# Patient Record
Sex: FEMALE | Race: WHITE
[De-identification: names, ages, dates, MRNs, and addresses within clinical notes are randomized per-mention and may not be internally consistent; named-entity substitution may affect disease eponyms.]

---

## 2020-06-16 ENCOUNTER — APPOINTMENT (OUTPATIENT)
Dept: FAMILY MEDICINE | Facility: CLINIC | Age: 35
End: 2020-06-16
Payer: COMMERCIAL

## 2020-06-16 ENCOUNTER — NON-APPOINTMENT (OUTPATIENT)
Age: 35
End: 2020-06-16

## 2020-06-16 VITALS
HEIGHT: 68 IN | HEART RATE: 115 BPM | WEIGHT: 205 LBS | TEMPERATURE: 99.1 F | RESPIRATION RATE: 14 BRPM | OXYGEN SATURATION: 98 % | DIASTOLIC BLOOD PRESSURE: 80 MMHG | SYSTOLIC BLOOD PRESSURE: 118 MMHG | BODY MASS INDEX: 31.07 KG/M2

## 2020-06-16 DIAGNOSIS — Z76.89 PERSONS ENCOUNTERING HEALTH SERVICES IN OTHER SPECIFIED CIRCUMSTANCES: ICD-10-CM

## 2020-06-16 PROCEDURE — 93000 ELECTROCARDIOGRAM COMPLETE: CPT

## 2020-06-16 PROCEDURE — 36415 COLL VENOUS BLD VENIPUNCTURE: CPT

## 2020-06-16 PROCEDURE — 99204 OFFICE O/P NEW MOD 45 MIN: CPT | Mod: 25

## 2020-06-16 NOTE — REVIEW OF SYSTEMS
[Joint Pain] : joint pain [Recent Change In Weight] : ~T recent weight change [Joint Swelling] : joint swelling [Muscle Pain] : no muscle pain [Muscle Weakness] : muscle weakness [Joint Stiffness] : joint stiffness [Skin Rash] : skin rash [Back Pain] : back pain [FreeTextEntry1] : breast large [Negative] : Gastrointestinal

## 2020-06-16 NOTE — HEALTH RISK ASSESSMENT
[No] : In the past 12 months have you used drugs other than those required for medical reasons? No [Patient declined mammogram] : Patient declined mammogram [Patient declined bone density test] : Patient declined bone density test [HIV Test offered] : HIV Test offered [Patient declined colonoscopy] : Patient declined colonoscopy [Hepatitis C test offered] : Hepatitis C test offered [None] : None [With Family] : lives with family [# of Members in Household ___] :  household currently consist of [unfilled] member(s) [Employed] : employed [College] : College [] :  [# Of Children ___] : has [unfilled] children [Sexually Active] : sexually active [Feels Safe at Home] : Feels safe at home [Fully functional (bathing, dressing, toileting, transferring, walking, feeding)] : Fully functional (bathing, dressing, toileting, transferring, walking, feeding) [Fully functional (using the telephone, shopping, preparing meals, housekeeping, doing laundry, using] : Fully functional and needs no help or supervision to perform IADLs (using the telephone, shopping, preparing meals, housekeeping, doing laundry, using transportation, managing medications and managing finances) [Reports normal functional visual acuity (ie: able to read med bottle)] : Reports normal functional visual acuity [Smoke Detector] : smoke detector [Carbon Monoxide Detector] : carbon monoxide detector [Safety elements used in home] : safety elements used in home [Sunscreen] : uses sunscreen [Seat Belt] :  uses seat belt [No falls in past year] : Patient reported no falls in the past year [Intercurrent Urgi Care visits] : went to urgent care [Change in mental status noted] : No change in mental status noted [Behavior] : denies difficulty with behavior [Language] : denies difficulty with language [Handling Complex Tasks] : denies difficulty handling complex tasks [Learning/Retaining New Information] : denies difficulty learning/retaining new information [Reasoning] : denies difficulty with reasoning [Reports changes in hearing] : Reports no changes in hearing [Reports changes in vision] : Reports no changes in vision [Spatial Ability and Orientation] : denies difficulty with spatial ability and orientation [Reports changes in dental health] : Reports no changes in dental health [Travel to Developing Areas] : does not  travel to developing areas [Guns at Home] : no guns at home [TB Exposure] : is not being exposed to tuberculosis [Caregiver Concerns] : does not have caregiver concerns [PapSmearDate] : 2018 [de-identified] : no [] : No [Audit-CScore] : 0 [de-identified] : no

## 2020-06-16 NOTE — ASSESSMENT
[FreeTextEntry1] : Basic cardiovascular prevention measures are advised including regular exercise, surveillance medical examination, and prudent portion-controlled low fat diet, rich in a variety of vegetables with minimal added sugars, refined starches, and no artificially hydrogenated oils.\par Labs drawn/ specimens obtained  in office on this date of service  for evaluation of   assessed conditions -   cbc cmp   tsh     to be run at [facility].\par The BMI for CONRAD was assessed. The BMI was found to be high, intervention performed was: lifestyle education regarding diet. Being overweight can negatively impact your health  by increasing risk of high blood pressure, heart disease, stroke, arthritis, or joint pain and diabetes. It is important to exercise 150 minutes per week as well as maintain a calorie appropriate diet that is rich in fruits and vegetables, and low in excess salt and fat.\par \par check echo ro valvular issue she is interested in weight loss medication- phentermine\par .check xray right ankle then mri right ankle no contrast ro tear\par EKG performed on this day in the office and reviewed by AYAH Qiu. It is stable.\par We spent sufficient time to discuss aspects of care; questions were answered  to patient's satisfaction.The diagnosis and care plan were discussed with patient in detail.  Patient test results were  reviewed and explained in full. All questions and concerns  were answered to the best of my knowledge.\par
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
## 2020-06-16 NOTE — PHYSICAL EXAM
[de-identified] : kyphosis shoulder grooves. [Normal] : affect was normal and insight and judgment were intact [de-identified] : large  + shoulder grooves.   [de-identified] : right lateral malleolus tender swollen .  [de-identified] : erythematous under right breast

## 2020-06-16 NOTE — COUNSELING
[Potential consequences of obesity discussed] : Potential consequences of obesity discussed [Benefits of weight loss discussed] : Benefits of weight loss discussed [Encouraged to maintain food diary] : Encouraged to maintain food diary [Structured Weight Management Program suggested:] : Structured weight management program suggested [Encouraged to use exercise tracking device] : Encouraged to use exercise tracking device [Encouraged to increase physical activity] : Encouraged to increase physical activity [Target Wt Loss Goal ___] : Weight Loss Goals: Target weight loss goal [unfilled] lbs [FreeTextEntry1] : minal panda nutritionist [Good understanding] : Patient has a good understanding of disease, goals and obesity follow-up plan

## 2020-06-17 ENCOUNTER — TRANSCRIPTION ENCOUNTER (OUTPATIENT)
Age: 35
End: 2020-06-17

## 2020-06-17 LAB
24R-OH-CALCIDIOL SERPL-MCNC: 59.8 PG/ML
ALBUMIN SERPL ELPH-MCNC: 4.9 G/DL
ALP BLD-CCNC: 69 U/L
ALT SERPL-CCNC: 26 U/L
ANION GAP SERPL CALC-SCNC: 13 MMOL/L
AST SERPL-CCNC: 26 U/L
BASOPHILS # BLD AUTO: 0.06 K/UL
BASOPHILS NFR BLD AUTO: 1.1 %
BILIRUB SERPL-MCNC: 0.3 MG/DL
BUN SERPL-MCNC: 15 MG/DL
CALCIUM SERPL-MCNC: 9.8 MG/DL
CHLORIDE SERPL-SCNC: 103 MMOL/L
CO2 SERPL-SCNC: 24 MMOL/L
CREAT SERPL-MCNC: 1.09 MG/DL
EOSINOPHIL # BLD AUTO: 0.09 K/UL
EOSINOPHIL NFR BLD AUTO: 1.7 %
ESTIMATED AVERAGE GLUCOSE: 97 MG/DL
FERRITIN SERPL-MCNC: 65 NG/ML
FOLATE SERPL-MCNC: 13.9 NG/ML
GLUCOSE SERPL-MCNC: 103 MG/DL
HBA1C MFR BLD HPLC: 5 %
HCT VFR BLD CALC: 41.3 %
HCV AB SER QL: NONREACTIVE
HCV S/CO RATIO: 0.13 S/CO
HGB BLD-MCNC: 13.2 G/DL
IMM GRANULOCYTES NFR BLD AUTO: 0.4 %
IRON SATN MFR SERPL: 32 %
IRON SERPL-MCNC: 112 UG/DL
LYMPHOCYTES # BLD AUTO: 2.08 K/UL
LYMPHOCYTES NFR BLD AUTO: 39.4 %
MAGNESIUM SERPL-MCNC: 2.1 MG/DL
MAN DIFF?: NORMAL
MCHC RBC-ENTMCNC: 31.3 PG
MCHC RBC-ENTMCNC: 32 GM/DL
MCV RBC AUTO: 97.9 FL
MONOCYTES # BLD AUTO: 0.46 K/UL
MONOCYTES NFR BLD AUTO: 8.7 %
NEUTROPHILS # BLD AUTO: 2.57 K/UL
NEUTROPHILS NFR BLD AUTO: 48.7 %
PLATELET # BLD AUTO: 292 K/UL
POTASSIUM SERPL-SCNC: 4.2 MMOL/L
PROT SERPL-MCNC: 8 G/DL
RBC # BLD: 4.22 M/UL
RBC # FLD: 12.8 %
SARS-COV-2 IGG SERPL IA-ACNC: <3.8 AU/ML
SARS-COV-2 IGG SERPL QL IA: NEGATIVE
SODIUM SERPL-SCNC: 140 MMOL/L
SUREPATH (TM) PAP: NORMAL
T3 SERPL-MCNC: 89 NG/DL
T3FREE SERPL-MCNC: 2.71 PG/ML
T4 FREE SERPL-MCNC: 1.2 NG/DL
TIBC SERPL-MCNC: 345 UG/DL
TSH SERPL-ACNC: 1.38 UIU/ML
UIBC SERPL-MCNC: 233 UG/DL
VIT B12 SERPL-MCNC: 501 PG/ML
WBC # FLD AUTO: 5.28 K/UL

## 2020-06-19 ENCOUNTER — TRANSCRIPTION ENCOUNTER (OUTPATIENT)
Age: 35
End: 2020-06-19

## 2020-06-20 ENCOUNTER — NON-APPOINTMENT (OUTPATIENT)
Age: 35
End: 2020-06-20

## 2020-06-24 ENCOUNTER — TRANSCRIPTION ENCOUNTER (OUTPATIENT)
Age: 35
End: 2020-06-24

## 2020-06-26 ENCOUNTER — RX CHANGE (OUTPATIENT)
Age: 35
End: 2020-06-26

## 2020-08-04 ENCOUNTER — RESULT CHARGE (OUTPATIENT)
Age: 35
End: 2020-08-04

## 2020-08-04 ENCOUNTER — APPOINTMENT (OUTPATIENT)
Dept: FAMILY MEDICINE | Facility: CLINIC | Age: 35
End: 2020-08-04
Payer: COMMERCIAL

## 2020-08-04 VITALS
HEART RATE: 78 BPM | BODY MASS INDEX: 30.01 KG/M2 | RESPIRATION RATE: 14 BRPM | TEMPERATURE: 97.5 F | WEIGHT: 198 LBS | DIASTOLIC BLOOD PRESSURE: 70 MMHG | OXYGEN SATURATION: 99 % | HEIGHT: 68 IN | SYSTOLIC BLOOD PRESSURE: 100 MMHG

## 2020-08-04 DIAGNOSIS — Z02.71 ENCOUNTER FOR DISABILITY DETERMINATION: ICD-10-CM

## 2020-08-04 LAB
BILIRUB UR QL STRIP: NEGATIVE
GLUCOSE UR-MCNC: NEGATIVE
HCG UR QL: 0.2 EU/DL
HGB UR QL STRIP.AUTO: NEGATIVE
KETONES UR-MCNC: NEGATIVE
LEUKOCYTE ESTERASE UR QL STRIP: NEGATIVE
NITRITE UR QL STRIP: NEGATIVE
PH UR STRIP: 7
PROT UR STRIP-MCNC: NEGATIVE
SP GR UR STRIP: 1.01

## 2020-08-04 PROCEDURE — 99214 OFFICE O/P EST MOD 30 MIN: CPT

## 2020-08-06 PROBLEM — Z02.71 ENCOUNTER FOR DISABILITY EXAMINATION: Status: ACTIVE | Noted: 2020-08-06

## 2020-08-06 NOTE — PHYSICAL EXAM
[Normal] : normal rate, regular rhythm, normal S1 and S2 and no murmur heard [de-identified] : ankle pain

## 2020-08-06 NOTE — HISTORY OF PRESENT ILLNESS
[FreeTextEntry1] : patient presents for fmla\par  [de-identified] : 36 yo wf here for follow up \par she needs to have surgery on her ankle and she is not sure the procedure /protocol\par \par I see Antwon Arvizu I am on 1180 for 2 weeks to jump start my metabolism. I did not meet w him but he emailed me a menu\par I followed it I have lost weight\par i have been diligent\par My  lost 15 pounds\par I notice  the phentermine the first two weeks but not the last\par

## 2020-08-06 NOTE — ASSESSMENT
[FreeTextEntry1] : fmla paperwork for her  who will be taking care of her  .Milad lerma. \par  renew phentermine for weight loss she is doing very very well and she is trying very hard\par The BMI for CONRAD was assessed. The BMI was found to be high, intervention performed was: lifestyle education regarding diet. Being overweight can negatively impact your health  by increasing risk of high blood pressure, heart disease, stroke, arthritis, or joint pain and diabetes. It is important to exercise 150 minutes per week as well as maintain a calorie appropriate diet that is rich in fruits and vegetables, and low in excess salt and fat.\par

## 2020-09-02 ENCOUNTER — TRANSCRIPTION ENCOUNTER (OUTPATIENT)
Age: 35
End: 2020-09-02

## 2020-10-05 ENCOUNTER — TRANSCRIPTION ENCOUNTER (OUTPATIENT)
Age: 35
End: 2020-10-05

## 2020-10-05 RX ORDER — PHENTERMINE HYDROCHLORIDE 15 MG/1
15 CAPSULE ORAL
Qty: 30 | Refills: 0 | Status: DISCONTINUED | COMMUNITY
Start: 2020-06-24 | End: 2020-10-05

## 2020-10-06 ENCOUNTER — TRANSCRIPTION ENCOUNTER (OUTPATIENT)
Age: 35
End: 2020-10-06

## 2020-10-06 VITALS — BODY MASS INDEX: 28.13 KG/M2 | WEIGHT: 185 LBS

## 2020-10-16 ENCOUNTER — APPOINTMENT (OUTPATIENT)
Dept: FAMILY MEDICINE | Facility: CLINIC | Age: 35
End: 2020-10-16
Payer: COMMERCIAL

## 2020-10-16 VITALS
SYSTOLIC BLOOD PRESSURE: 110 MMHG | HEIGHT: 68 IN | OXYGEN SATURATION: 99 % | HEART RATE: 68 BPM | BODY MASS INDEX: 28.04 KG/M2 | RESPIRATION RATE: 14 BRPM | DIASTOLIC BLOOD PRESSURE: 80 MMHG | WEIGHT: 185 LBS | TEMPERATURE: 97.8 F

## 2020-10-16 DIAGNOSIS — Z23 ENCOUNTER FOR IMMUNIZATION: ICD-10-CM

## 2020-10-16 PROCEDURE — 99213 OFFICE O/P EST LOW 20 MIN: CPT | Mod: 25

## 2020-10-16 PROCEDURE — G0008: CPT

## 2020-10-16 PROCEDURE — 90686 IIV4 VACC NO PRSV 0.5 ML IM: CPT

## 2020-10-16 NOTE — PHYSICAL EXAM
[Normal] : no respiratory distress, lungs were clear to auscultation bilaterally and no accessory muscle use [de-identified] : ankle pain
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
## 2020-10-16 NOTE — REVIEW OF SYSTEMS
[Recent Change In Weight] : ~T recent weight change [Fatigue] : fatigue [Joint Pain] : joint pain [FreeTextEntry2] : loss 30 pounds

## 2020-10-16 NOTE — ASSESSMENT
[FreeTextEntry1] :  \par  renew phentermine for weight loss she is doing very well contact ALEA panda\par The BMI for CONRAD was assessed. The BMI was found to be high, intervention performed was: lifestyle education regarding diet. Being overweight can negatively impact your health  by increasing risk of high blood pressure, heart disease, stroke, arthritis, or joint pain and diabetes. It is important to exercise 150 minutes per week as well as maintain a calorie appropriate diet that is rich in fruits and vegetables, and low in excess salt and fat.\par  Information regarding flu shot reviewed. All questions answered.Flu shot .5 cc IM   left   deltoid . No reaction noted. Advised patients to wash hands to reduce the spread of infection.\par  wound healing measure reviewed for ankle

## 2020-11-16 ENCOUNTER — APPOINTMENT (OUTPATIENT)
Dept: FAMILY MEDICINE | Facility: CLINIC | Age: 35
End: 2020-11-16
Payer: COMMERCIAL

## 2020-11-16 VITALS
HEART RATE: 84 BPM | RESPIRATION RATE: 14 BRPM | SYSTOLIC BLOOD PRESSURE: 110 MMHG | OXYGEN SATURATION: 98 % | DIASTOLIC BLOOD PRESSURE: 78 MMHG | TEMPERATURE: 97.7 F | BODY MASS INDEX: 27.58 KG/M2 | WEIGHT: 182 LBS | HEIGHT: 68 IN

## 2020-11-16 PROCEDURE — 99072 ADDL SUPL MATRL&STAF TM PHE: CPT

## 2020-11-16 PROCEDURE — 99213 OFFICE O/P EST LOW 20 MIN: CPT

## 2020-11-16 NOTE — HISTORY OF PRESENT ILLNESS
[FreeTextEntry1] : pt presents for med follow up  [de-identified] : Patient reports today for follow up and med check. Patient reports feeling well today. She has lost 3 pounds since last visit. Patient feels weight loss has been more stagnant recently since ankle sx. NEFTALY mcfarland just got cleared to go back to the gym, she is still working with nutritionist. She denies any side effects.

## 2020-11-16 NOTE — ASSESSMENT
[FreeTextEntry1] : Phentermine increased to 37.5mg daily\par ISTOP checked. Reference #: [id number]\par Precautions reviewed. \par RTO in 1 mo

## 2020-12-16 ENCOUNTER — APPOINTMENT (OUTPATIENT)
Dept: OBGYN | Facility: CLINIC | Age: 35
End: 2020-12-16
Payer: COMMERCIAL

## 2020-12-16 VITALS
HEIGHT: 68 IN | TEMPERATURE: 97.3 F | DIASTOLIC BLOOD PRESSURE: 70 MMHG | SYSTOLIC BLOOD PRESSURE: 120 MMHG | BODY MASS INDEX: 27.79 KG/M2 | WEIGHT: 183.38 LBS

## 2020-12-16 DIAGNOSIS — Z23 ENCOUNTER FOR IMMUNIZATION: ICD-10-CM

## 2020-12-16 DIAGNOSIS — Z87.440 PERSONAL HISTORY OF URINARY (TRACT) INFECTIONS: ICD-10-CM

## 2020-12-16 DIAGNOSIS — Z01.419 ENCOUNTER FOR GYNECOLOGICAL EXAMINATION (GENERAL) (ROUTINE) W/OUT ABNORMAL FINDINGS: ICD-10-CM

## 2020-12-16 DIAGNOSIS — Z80.0 FAMILY HISTORY OF MALIGNANT NEOPLASM OF DIGESTIVE ORGANS: ICD-10-CM

## 2020-12-16 DIAGNOSIS — Z78.9 OTHER SPECIFIED HEALTH STATUS: ICD-10-CM

## 2020-12-16 LAB
HCG UR QL: NEGATIVE
QUALITY CONTROL: YES

## 2020-12-16 PROCEDURE — 99385 PREV VISIT NEW AGE 18-39: CPT | Mod: 25

## 2020-12-16 PROCEDURE — 90649 4VHPV VACCINE 3 DOSE IM: CPT

## 2020-12-16 PROCEDURE — 99072 ADDL SUPL MATRL&STAF TM PHE: CPT

## 2020-12-16 PROCEDURE — 81025 URINE PREGNANCY TEST: CPT

## 2020-12-16 PROCEDURE — 90471 IMMUNIZATION ADMIN: CPT

## 2020-12-16 NOTE — HISTORY OF PRESENT ILLNESS
[Patient reported PAP Smear was abnormal] : Patient reported PAP Smear was abnormal [HIV test declined] : HIV test declined [Syphilis test declined] : Syphilis test declined [Hepatitis B test declined] : Hepatitis B test declined [Hepatitis C test declined] : Hepatitis C test declined [Natural Family Planning] : uses natural family planning [Monogamous (Male Partner)] : is monogamous with a male partner [Y] : Positive pregnancy history [PapSmeardate] : 2019 [TextBox_31] : jong ob/gyn, unsure of abnormality [LMPDate] : 11/23/20 [MensesFreq] : 28 [MensesLength] : 5-6 [de-identified] : x 7yrs [PGHxTotal] : 3 [[facility] : 2 [PGHxABInduced] : 1 [FreeTextEntry1] : 2017: NOEMY REID, 7lb4oz, FT. 2019: RACQUEL REID, 8lb4oz, FT. ETOP with D&C. Denies cysts, fibroids, STI. Hx of abnormal pap 2019

## 2020-12-16 NOTE — DISCUSSION/SUMMARY
[FreeTextEntry1] : 34 yo here for well woman exam, h/o abnormal pap 2019. \par 1) Health maintenance: \par Pap + HPV\par GC/CT\par Gardasil #1 given left deltoid without incident\par \par 2) Contraception: natural family planning \par \par Return in 2 months for Gardasil #2\par \par

## 2020-12-16 NOTE — PHYSICAL EXAM
[Appropriately responsive] : appropriately responsive [Alert] : alert [No Acute Distress] : no acute distress [No Lymphadenopathy] : no lymphadenopathy [Soft] : soft [Non-tender] : non-tender [Non-distended] : non-distended [No HSM] : No HSM [No Lesions] : no lesions [No Mass] : no mass [Oriented x3] : oriented x3 [Examination Of The Breasts] : a normal appearance [No Discharge] : no discharge [No Masses] : no breast masses were palpable [Labia Majora] : normal [Labia Minora] : normal [Normal] : normal [Uterine Adnexae] : normal [Tenderness] : nontender [Enlarged ___ wks] : not enlarged [FreeTextEntry8] : nodular area palpated on posterior lip of cervix likely nabothian cyst

## 2020-12-18 ENCOUNTER — APPOINTMENT (OUTPATIENT)
Dept: FAMILY MEDICINE | Facility: CLINIC | Age: 35
End: 2020-12-18
Payer: COMMERCIAL

## 2020-12-18 VITALS
DIASTOLIC BLOOD PRESSURE: 74 MMHG | OXYGEN SATURATION: 99 % | RESPIRATION RATE: 14 BRPM | WEIGHT: 184 LBS | HEIGHT: 68 IN | TEMPERATURE: 97.2 F | BODY MASS INDEX: 27.89 KG/M2 | SYSTOLIC BLOOD PRESSURE: 120 MMHG | HEART RATE: 62 BPM

## 2020-12-18 LAB
C TRACH RRNA SPEC QL NAA+PROBE: NOT DETECTED
HPV HIGH+LOW RISK DNA PNL CVX: NOT DETECTED
N GONORRHOEA RRNA SPEC QL NAA+PROBE: NOT DETECTED
SOURCE TP AMPLIFICATION: NORMAL

## 2020-12-18 PROCEDURE — 99072 ADDL SUPL MATRL&STAF TM PHE: CPT

## 2020-12-18 PROCEDURE — 99213 OFFICE O/P EST LOW 20 MIN: CPT

## 2020-12-18 RX ORDER — PHENTERMINE HYDROCHLORIDE 30 MG/1
30 CAPSULE ORAL
Qty: 30 | Refills: 0 | Status: COMPLETED | COMMUNITY
Start: 2020-10-05

## 2020-12-18 RX ORDER — OXYCODONE AND ACETAMINOPHEN 5; 325 MG/1; MG/1
5-325 TABLET ORAL
Qty: 30 | Refills: 0 | Status: COMPLETED | COMMUNITY
Start: 2020-09-02

## 2020-12-18 NOTE — HISTORY OF PRESENT ILLNESS
[FreeTextEntry1] : pt presents for med follow up  [de-identified] : Patient reports today for follow up and med check. Patient reports feeling well today. She is not able to exercise bc of the pandemic. she will get her menses tomorrow. \par \par I have been taking it every other day.\par it is more effective when I exercise. so since I am not exercising then I don’t take it and I want to get the most out of it. \par we abide by the diet \par He sent me the 14 d meal plan and jump start. \par I need a face to face. the zoom doesn’t work for me\par I am happy where I am \par i lost 30 pounds. I fit in all y clothes. \par

## 2020-12-18 NOTE — ASSESSMENT
[FreeTextEntry1] : Phentermine increased to 37.5mg daily this is her last renewal istop checked\par  The BMI for CONRAD was assessed. The BMI was found to be high, intervention performed was: lifestyle education regarding diet. Being overweight can negatively impact your health  by increasing risk of high blood pressure, heart disease, stroke, arthritis, or joint pain and diabetes. It is important to exercise 150 minutes per week as well as maintain a calorie appropriate diet that is rich in fruits and vegetables, and low in excess salt and fat.\par follow up Antwon Brand for diet \par

## 2020-12-23 LAB — CYTOLOGY CVX/VAG DOC THIN PREP: NORMAL

## 2021-01-25 ENCOUNTER — TRANSCRIPTION ENCOUNTER (OUTPATIENT)
Age: 36
End: 2021-01-25

## 2021-01-27 ENCOUNTER — TRANSCRIPTION ENCOUNTER (OUTPATIENT)
Age: 36
End: 2021-01-27

## 2021-01-27 DIAGNOSIS — Z00.00 ENCOUNTER FOR GENERAL ADULT MEDICAL EXAMINATION W/OUT ABNORMAL FINDINGS: ICD-10-CM

## 2021-02-16 ENCOUNTER — APPOINTMENT (OUTPATIENT)
Dept: OBGYN | Facility: CLINIC | Age: 36
End: 2021-02-16

## 2021-02-16 VITALS
BODY MASS INDEX: 27.28 KG/M2 | WEIGHT: 180 LBS | HEIGHT: 68 IN | DIASTOLIC BLOOD PRESSURE: 80 MMHG | RESPIRATION RATE: 16 BRPM | SYSTOLIC BLOOD PRESSURE: 112 MMHG

## 2021-02-16 LAB
HCG UR QL: NEGATIVE
QUALITY CONTROL: YES

## 2022-10-04 ENCOUNTER — NON-APPOINTMENT (OUTPATIENT)
Age: 37
End: 2022-10-04

## 2022-10-04 ENCOUNTER — APPOINTMENT (OUTPATIENT)
Dept: FAMILY MEDICINE | Facility: CLINIC | Age: 37
End: 2022-10-04

## 2022-10-04 VITALS
DIASTOLIC BLOOD PRESSURE: 80 MMHG | HEART RATE: 83 BPM | OXYGEN SATURATION: 98 % | WEIGHT: 211 LBS | SYSTOLIC BLOOD PRESSURE: 122 MMHG | BODY MASS INDEX: 31.98 KG/M2 | HEIGHT: 68 IN | RESPIRATION RATE: 14 BRPM

## 2022-10-04 DIAGNOSIS — R53.83 OTHER FATIGUE: ICD-10-CM

## 2022-10-04 DIAGNOSIS — M25.571 PAIN IN RIGHT ANKLE AND JOINTS OF RIGHT FOOT: ICD-10-CM
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
## 2022-10-04 PROCEDURE — 36415 COLL VENOUS BLD VENIPUNCTURE: CPT

## 2022-10-04 PROCEDURE — 99214 OFFICE O/P EST MOD 30 MIN: CPT | Mod: 25

## 2022-10-04 PROCEDURE — 93000 ELECTROCARDIOGRAM COMPLETE: CPT

## 2022-10-04 NOTE — REVIEW OF SYSTEMS
[Recent Change In Weight] : ~T recent weight change [Negative] : Respiratory [FreeTextEntry2] : post partum [FreeTextEntry9] : enlarged breast

## 2022-10-04 NOTE — HISTORY OF PRESENT ILLNESS
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 she is not depressed. she is in the thick of it. her kids have milk intolerance if she has anything with it the child is miserable. \par  baby is healthy\par she saw a nutritionist. \par she stopped. as she got her daughter into formula.  \par It was not enough calorie dieficit  she did not lose weight. Tisha Purcell the [employer] she gave me a meal plan.  she thought I was in starvation mode and it was hormonal. 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 she even hired a . 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 \par My ankle is great. \par \par I see Dr Dill for gyn. \par My  got a vascectomy

## 2022-10-04 NOTE — ASSESSMENT
[FreeTextEntry1] : The BMI for CONRAD was assessed. The BMI was found to be high, intervention performed was: lifestyle education regarding diet. Being overweight can negatively impact your health  by increasing risk of high blood pressure, heart disease, stroke, arthritis, or joint pain and diabetes. It is important to exercise 150 minutes per week as well as maintain a calorie appropriate diet that is rich in fruits and vegetables, and low in excess salt and fat.\par Labs drawn/ specimens obtained  in office on this date of service  for evaluation of   assessed conditions -      to be run at [facility].\par EKG performed on this day in the office  to evaluate for any ischemia, new arrhythmia, and for any significant changes and reviewed by AYAH Qiu. It is stable.\par \par start phentermine 30 mg  Patient counseled on proper medications administration and treatment duration as well as the importance of medication adherence. I discussed at length the risks and benefits of treatment and possible side effects.  Advised patient to discontinue medication and call seek medical attention if any adverse reactions are noted.  \par Medication compliance , medication side effects and adverse reaction and the importance of compliance was reinforced. Recommend the patient take medications the same time every day to prevent missing a dose. It is also important to keep a list of medications, doses, and purpose of taking the medications, handy in your wallet or purse, so that way all medical personnel know exactly what you are taking.\par \par \par cont personal training\par cont nutritionist\par rto 1 m\par

## 2022-10-05 ENCOUNTER — TRANSCRIPTION ENCOUNTER (OUTPATIENT)
Age: 37
End: 2022-10-05

## 2022-10-07 ENCOUNTER — TRANSCRIPTION ENCOUNTER (OUTPATIENT)
Age: 37
End: 2022-10-07

## 2022-10-07 LAB
A PHAGOCYTOPH IGG TITR SER IF: NORMAL TITER
ALBUMIN SERPL ELPH-MCNC: 4.9 G/DL
ALP BLD-CCNC: 112 U/L
ALT SERPL-CCNC: 25 U/L
ANION GAP SERPL CALC-SCNC: 17 MMOL/L
AST SERPL-CCNC: 26 U/L
B BURGDOR AB SER QL IA: NEGATIVE
B MICROTI IGG TITR SER: NORMAL TITER
BASOPHILS # BLD AUTO: 0.07 K/UL
BASOPHILS NFR BLD AUTO: 1.3 %
BILIRUB SERPL-MCNC: 0.3 MG/DL
BUN SERPL-MCNC: 17 MG/DL
CALCIUM SERPL-MCNC: 10 MG/DL
CHLORIDE SERPL-SCNC: 100 MMOL/L
CO2 SERPL-SCNC: 19 MMOL/L
CREAT SERPL-MCNC: 0.81 MG/DL
E CHAFFEENSIS IGG TITR SER IF: NORMAL TITER
EGFR: 96 ML/MIN/1.73M2
EOSINOPHIL # BLD AUTO: 0.08 K/UL
EOSINOPHIL NFR BLD AUTO: 1.5 %
GLUCOSE SERPL-MCNC: 91 MG/DL
HCT VFR BLD CALC: 40 %
HGB BLD-MCNC: 13.4 G/DL
IMM GRANULOCYTES NFR BLD AUTO: 0.4 %
IRON SATN MFR SERPL: 30 %
IRON SERPL-MCNC: 120 UG/DL
LYMPHOCYTES # BLD AUTO: 2.02 K/UL
LYMPHOCYTES NFR BLD AUTO: 36.7 %
MAGNESIUM SERPL-MCNC: 1.9 MG/DL
MAN DIFF?: NORMAL
MCHC RBC-ENTMCNC: 31.5 PG
MCHC RBC-ENTMCNC: 33.5 GM/DL
MCV RBC AUTO: 93.9 FL
MONOCYTES # BLD AUTO: 0.58 K/UL
MONOCYTES NFR BLD AUTO: 10.5 %
NEUTROPHILS # BLD AUTO: 2.74 K/UL
NEUTROPHILS NFR BLD AUTO: 49.6 %
PLATELET # BLD AUTO: 325 K/UL
POTASSIUM SERPL-SCNC: 4.8 MMOL/L
PROT SERPL-MCNC: 8 G/DL
RBC # BLD: 4.26 M/UL
RBC # FLD: 12.5 %
SODIUM SERPL-SCNC: 136 MMOL/L
T3 SERPL-MCNC: 87 NG/DL
T3FREE SERPL-MCNC: 2.81 PG/ML
T4 FREE SERPL-MCNC: 1.1 NG/DL
TIBC SERPL-MCNC: 404 UG/DL
TSH SERPL-ACNC: 1.47 UIU/ML
UIBC SERPL-MCNC: 284 UG/DL
WBC # FLD AUTO: 5.51 K/UL

## 2022-11-07 ENCOUNTER — APPOINTMENT (OUTPATIENT)
Dept: FAMILY MEDICINE | Facility: CLINIC | Age: 37
End: 2022-11-07

## 2022-11-07 VITALS
HEIGHT: 68 IN | DIASTOLIC BLOOD PRESSURE: 88 MMHG | TEMPERATURE: 97 F | BODY MASS INDEX: 31.22 KG/M2 | SYSTOLIC BLOOD PRESSURE: 108 MMHG | HEART RATE: 69 BPM | OXYGEN SATURATION: 99 % | RESPIRATION RATE: 15 BRPM | WEIGHT: 206 LBS

## 2022-11-07 PROCEDURE — 99213 OFFICE O/P EST LOW 20 MIN: CPT

## 2022-11-07 NOTE — ASSESSMENT
[FreeTextEntry1] : The BMI for CONRAD was assessed. The BMI was found to be high, intervention performed was: lifestyle education regarding diet. Being overweight can negatively impact your health  by increasing risk of high blood pressure, heart disease, stroke, arthritis, or joint pain and diabetes. It is important to exercise 150 minutes per week as well as maintain a calorie appropriate diet that is rich in fruits and vegetables, and low in excess salt and fat.\par  \par  \par \par cont  phentermine 37.5 mg  Patient counseled on proper medications administration and treatment duration as well as the importance of medication adherence. I discussed at length the risks and benefits of treatment and possible side effects.  Advised patient to discontinue medication and call seek medical attention if any adverse reactions are noted.  \par Medication compliance , medication side effects and adverse reaction and the importance of compliance was reinforced. Recommend the patient take medications the same time every day to prevent missing a dose. It is also important to keep a list of medications, doses, and purpose of taking the medications, handy in your wallet or purse, so that way all medical personnel know exactly what you are taking.\par \par \par cont going to the gym \par  \par rto 1 m\par

## 2022-11-07 NOTE — HISTORY OF PRESENT ILLNESS
[FreeTextEntry1] : patient presents for medfollow up  [de-identified] : 38 yo wf here for follow up\par I feel better. 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 she is not depressed. she is in the thick of it. her kids have milk intolerance if she has anything with it the child is miserable. \par  baby is healthy\par she saw a nutritionist. \par she stopped. as she got her daughter into formula.  \par It was not enough calorie dieficit  she did not lose weight. Tisha Purcell the [employer] she gave me a meal plan.  she thought I was in starvation mode and it was hormonal. 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 she even hired a . 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 \par My ankle is great. \par \par I see Dr Dill for gyn. \par My  got a vascectomy

## 2022-12-02 ENCOUNTER — TRANSCRIPTION ENCOUNTER (OUTPATIENT)
Age: 37
End: 2022-12-02

## 2022-12-02 VITALS — BODY MASS INDEX: 29.95 KG/M2 | WEIGHT: 197 LBS

## 2023-01-05 ENCOUNTER — APPOINTMENT (OUTPATIENT)
Dept: FAMILY MEDICINE | Facility: CLINIC | Age: 38
End: 2023-01-05
Payer: COMMERCIAL

## 2023-01-05 VITALS — TEMPERATURE: 97.2 F

## 2023-01-05 VITALS
HEIGHT: 68 IN | RESPIRATION RATE: 14 BRPM | OXYGEN SATURATION: 98 % | BODY MASS INDEX: 30.01 KG/M2 | DIASTOLIC BLOOD PRESSURE: 78 MMHG | WEIGHT: 198 LBS | SYSTOLIC BLOOD PRESSURE: 116 MMHG | HEART RATE: 93 BPM

## 2023-01-05 DIAGNOSIS — R63.5 ABNORMAL WEIGHT GAIN: ICD-10-CM

## 2023-01-05 DIAGNOSIS — M25.50 PAIN IN UNSPECIFIED JOINT: ICD-10-CM

## 2023-01-05 PROCEDURE — 99213 OFFICE O/P EST LOW 20 MIN: CPT

## 2023-01-05 NOTE — HISTORY OF PRESENT ILLNESS
[FreeTextEntry1] : patient presents for medfollow up  [de-identified] : 36 yo wf here for follow up I am exercising elliptical gym classes. \par \par 11/7/22\par I feel better. 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 she is not depressed. she is in the thick of it. her kids have milk intolerance if she has anything with it the child is miserable. \par  baby is healthy\par she saw a nutritionist. \par she stopped. as she got her daughter into formula.  \par It was not enough calorie dieficit  she did not lose weight. Tisha Purcell the [employer] she gave me a meal plan.  she thought I was in starvation mode and it was hormonal. 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 she even hired a . 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 \par My ankle is great. \par \par I see Dr Dill for gyn. \par My  got a vascectomy

## 2023-02-13 ENCOUNTER — APPOINTMENT (OUTPATIENT)
Dept: FAMILY MEDICINE | Facility: CLINIC | Age: 38
End: 2023-02-13

## 2023-03-13 ENCOUNTER — TRANSCRIPTION ENCOUNTER (OUTPATIENT)
Age: 38
End: 2023-03-13

## 2023-03-13 VITALS — BODY MASS INDEX: 29.35 KG/M2 | WEIGHT: 193 LBS

## 2023-03-13 RX ORDER — PHENTERMINE HYDROCHLORIDE 37.5 MG/1
37.5 CAPSULE ORAL
Qty: 30 | Refills: 0 | Status: ACTIVE | COMMUNITY
Start: 2020-08-04 | End: 1900-01-01